# Patient Record
Sex: FEMALE | URBAN - METROPOLITAN AREA
[De-identification: names, ages, dates, MRNs, and addresses within clinical notes are randomized per-mention and may not be internally consistent; named-entity substitution may affect disease eponyms.]

---

## 2022-02-25 ENCOUNTER — TRANSCRIBE ORDER (OUTPATIENT)
Dept: SCHEDULING | Age: 77
End: 2022-02-25

## 2022-02-25 DIAGNOSIS — Z12.31 ENCOUNTER FOR SCREENING MAMMOGRAM FOR BREAST CANCER: Primary | ICD-10-CM

## 2022-06-30 ENCOUNTER — OFFICE VISIT (OUTPATIENT)
Dept: NEUROLOGY | Age: 77
End: 2022-06-30
Payer: COMMERCIAL

## 2022-06-30 VITALS
BODY MASS INDEX: 24.8 KG/M2 | SYSTOLIC BLOOD PRESSURE: 136 MMHG | WEIGHT: 158 LBS | DIASTOLIC BLOOD PRESSURE: 76 MMHG | HEART RATE: 52 BPM | HEIGHT: 67 IN

## 2022-06-30 DIAGNOSIS — I67.1 BRAIN ANEURYSM: Primary | ICD-10-CM

## 2022-06-30 PROCEDURE — 99204 OFFICE O/P NEW MOD 45 MIN: CPT | Performed by: PSYCHIATRY & NEUROLOGY

## 2022-06-30 PROCEDURE — 1123F ACP DISCUSS/DSCN MKR DOCD: CPT | Performed by: PSYCHIATRY & NEUROLOGY

## 2022-06-30 RX ORDER — ATENOLOL 25 MG/1
TABLET ORAL
COMMUNITY
Start: 2022-04-06

## 2022-06-30 ASSESSMENT — ENCOUNTER SYMPTOMS
GASTROINTESTINAL NEGATIVE: 1
EYES NEGATIVE: 1
RESPIRATORY NEGATIVE: 1

## 2022-06-30 NOTE — PROGRESS NOTES
[address], [place], [address]  Phone: [phone] Fax [phone]  Dr. Jenny Velasquez      6/30/2022  Katharine Moffett     Patient is referred by the following provider for consultation regarding as below:       I reviewed the available records and notes and have examined patient with the following findings:     Chief Complaint:  Chief Complaint   Patient presents with   [facility] Establish Care    Neurologic Problem     h/o Brain Aneurysm          HPI: This is a right handed 68 y.o.  female is very pleasant very appropriate patient unfortunately in 2017 she had one of her episodes of vertigo where the room was spinning on her whether she laid down or stood up. It lasted a total of a week and recovered but in that week she had an MRI done and was told it was normal and she was fine everything went on the way. She actually eventually was worked up in the emergency room and told she had migraines and not true vertigo even though I do not think she had a migraine at that time. She does have a history of migraines but those are actually headaches. 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 And that was done July 2021 she relocated back here to [place] has not seen a neurologist other than this visit. She her headaches she has not needed her Imitrex 50 mg tablets since she is even know the last time she used it. But I made it clear she is not to take it any further. She does have a history of lower back pain she was seen down at the [facility] at the [facility] up in [place] and they identified an MRI showing degenerative disc disease that was in the 1990s. She has not had any further work-up for her back since then.   She has not had any bouts of vertigo she has not had any headaches recently. She is actually stable and comfortable. IMAGING REVIEW:  I REVIEWED PERTINENT  IMAGES AND REPORTS WITH THE PATIENT PERSONALLY, DIRECTLY AND FULLY. Past Medical History:  No past medical history on file. Past Surgical History:  No past surgical history on file. Social History:  Social History     Socioeconomic History    Marital status:      Spouse name: Not on file    Number of children: Not on file    Years of education: Not on file    Highest education level: Not on file   Occupational History    Not on file   Tobacco Use    Smoking status: Not on file    Smokeless tobacco: Not on file   Substance and Sexual Activity    Alcohol use: Not on file    Drug use: Not on file    Sexual activity: Not on file   Other Topics Concern    Not on file   Social History Narrative    Not on file     Social Determinants of Health     Financial Resource Strain:     Difficulty of Paying Living Expenses: Not on file   Food Insecurity:     Worried About Running Out of Food in the Last Year: Not on file    Debbie of Food in the Last Year: Not on file   Transportation Needs:     Lack of Transportation (Medical): Not on file    Lack of Transportation (Non-Medical):  Not on file   Physical Activity:     Days of Exercise per Week: Not on file    Minutes of Exercise per Session: Not on file   Stress:     Feeling of Stress : Not on file   Social Connections:     Frequency of Communication with Friends and Family: Not on file    Frequency of Social Gatherings with Friends and Family: Not on file    Attends Scientology Services: Not on file    Active Member of Clubs or Organizations: Not on file    Attends Club or Organization Meetings: Not on file    Marital Status: Not on file   Intimate Partner Violence:     Fear of Current or Ex-Partner: Not on file    Emotionally Abused: Not on file    Physically Abused: Not on file    Sexually Abused: Not on file   Housing Stability:     Unable to Pay for Housing in the Last Year: Not on file    Number of Places Lived in the Last Year: Not on file    Unstable Housing in the Last Year: Not on file       Family History:   No family history on file. Current Outpatient Medications on File Prior to Visit   Medication Sig Dispense Refill    atenolol (TENORMIN) 25 MG tablet TAKE 1 TABLET BY MOUTH EVERY DAY FOR 90 DAYS      Cholecalciferol 100 MCG (4000 UT) CAPS Take 1 capsule by mouth daily      Cyanocobalamin (VITAMIN B 12 PO) Take 2,000 mcg by mouth daily      Multiple Vitamin (MULTIVITAMIN PO) Take 1 tablet by mouth daily      GLUCOSAMINE-CHONDROITIN PO Take by mouth daily      VITAMIN E PO Take 670 mg by mouth daily      GARLIC PO Take by mouth      Calcium-Magnesium-Vitamin D (CALCIUM MAGNESIUM PO) Take by mouth      Ascorbic Acid (VITAMIN C PO) Take by mouth       No current facility-administered medications on file prior to visit. Allergies   Allergen Reactions    Prednisone Other (See Comments)     Intolerance      Other Nausea And Vomiting     Vicoden       Review of Systems:  Review of Systems   Constitutional: Negative. HENT: Negative. Eyes: Negative. Respiratory: Negative. Cardiovascular: Negative. Gastrointestinal: Negative. Endocrine: Negative. Genitourinary: Negative. Musculoskeletal: Negative. Skin: Negative. Neurological: Positive for headaches. Hematological: Negative. Psychiatric/Behavioral: Negative. No flowsheet data found. No flowsheet data found. Vitals:    06/30/22 1445   BP: 136/76   Site: Left Upper Arm   Position: Sitting   Pulse: 52   Weight: 158 lb (71.7 kg)   Height: 5' 7\" (1.702 m)        Physical Exam  Constitutional:       Appearance: Normal appearance. HENT:      Head: Normocephalic and atraumatic.    Eyes:      Extraocular Movements: Extraocular movements intact and EOM normal.   Cardiovascular:      Rate and Rhythm: Normal rate and regular rhythm. Pulses: Normal pulses. Pulmonary:      Effort: Pulmonary effort is normal.   Abdominal:      General: Abdomen is flat. Neurological:      Mental Status: She is alert and oriented to person, place, and time. Gait: Gait is intact. Deep Tendon Reflexes:      Reflex Scores:       Tricep reflexes are 1+ on the right side and 1+ on the left side. Bicep reflexes are 1+ on the right side and 1+ on the left side. Brachioradialis reflexes are 1+ on the right side and 1+ on the left side. Patellar reflexes are 1+ on the right side and 1+ on the left side. Achilles reflexes are 1+ on the right side and 1+ on the left side. Neurologic Exam     Mental Status   Oriented to person, place, and time. Attention: normal. Concentration: normal.   Level of consciousness: alert  Knowledge: good. Cranial Nerves     CN II   Visual fields full to confrontation. CN III, IV, VI   Extraocular motions are normal.     CN VII   Facial expression full, symmetric. Motor Exam   Right arm tone: normal  Left arm tone: normal  Right leg tone: normal  Left leg tone: normal    Gait, Coordination, and Reflexes     Gait  Gait: normal    Tremor   Resting tremor: absent  Intention tremor: absent  Action tremor: absent    Reflexes   Right brachioradialis: 1+  Left brachioradialis: 1+  Right biceps: 1+  Left biceps: 1+  Right triceps: 1+  Left triceps: 1+  Right patellar: 1+  Left patellar: 1+  Right achilles: 1+  Left achilles: 1+          Assessment   Assessment / Plan:    Cecelia Aly was seen today for establish care and neurologic problem. 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 The best I can tell as it had not changed since the 2017 MR I compared to the MRA done in 2021 only according to the patient and the situation. Her MRA that was done July 2021 we have the report but not the films and she is can obtain the films were to move forward with a CTA of the head and see if there is any true changes. I have asked her not to take Imitrex any further and I gave her samples of Nurtec if she happens to get a migraine headache.  -     CTA HEAD W WO CONTRAST; Future        The Diagnosis and differential diagnostic considerations, and Rx Tx were reviewed with the patient at length. Orders Placed This Encounter   Procedures    CTA HEAD W WO CONTRAST     Standing Status:   Future     Standing Expiration Date:   6/30/2023     Order Specific Question:   STAT Creatinine as needed:     Answer:   Yes     Order Specific Question:   Reason for exam:     Answer:   please compare to the MRA that she will bring. I have spent greater than 50% of visit discussing and counseling of patient  for treatment and diagnostic plan review. Total time 45 min     . Notes: Patient is to continue all medications as directed by prescribing physicians. Continuations on today's visit are made based on the patient's report of current medications.              Dr. Dolores Rocha  Consultation Neurology, Neurodiagnostics and Neurotherapeutics  Neuroelectrophysiology, EEG, EMG  [facility]  [address] [place] [address], [address] [facility]  Phone:  [phone]  Fax:   [phone]

## 2022-07-25 ENCOUNTER — TELEPHONE (OUTPATIENT)
Dept: NEUROLOGY | Age: 77
End: 2022-07-25

## 2022-07-25 ENCOUNTER — HOSPITAL ENCOUNTER (OUTPATIENT)
Dept: CT IMAGING | Age: 77
Discharge: HOME | End: 2022-07-27
Payer: COMMERCIAL

## 2022-07-25 DIAGNOSIS — I67.1 BRAIN ANEURYSM: ICD-10-CM

## 2022-07-25 LAB — CREAT BLD-MCNC: 1.17 MG/DL (ref 0.8–1.5)

## 2022-07-25 PROCEDURE — 6360000004 HC RX CONTRAST MEDICATION: Performed by: PSYCHIATRY & NEUROLOGY

## 2022-07-25 PROCEDURE — 82565 ASSAY OF CREATININE: CPT

## 2022-07-25 PROCEDURE — 70496 CT ANGIOGRAPHY HEAD: CPT

## 2022-07-25 RX ORDER — SODIUM CHLORIDE 0.9 % (FLUSH) 0.9 %
5-40 SYRINGE (ML) INJECTION 2 TIMES DAILY
Status: DISCONTINUED | OUTPATIENT
Start: 2022-07-25 | End: 2022-07-28 | Stop reason: HOSPADM

## 2022-07-25 RX ORDER — 0.9 % SODIUM CHLORIDE 0.9 %
100 INTRAVENOUS SOLUTION INTRAVENOUS ONCE
Status: DISCONTINUED | OUTPATIENT
Start: 2022-07-25 | End: 2022-07-28 | Stop reason: HOSPADM

## 2022-07-25 RX ADMIN — IOHEXOL 60 ML: 350 INJECTION, SOLUTION INTRAVENOUS at 10:12

## 2022-07-25 NOTE — TELEPHONE ENCOUNTER
----- Message from Yina Mitchell DO sent at 7/25/2022 12:37 PM EDT -----  Regarding: mri  The patient was supposed to bring her MRI/CTA of the arteries that she had last time in order for them to compare them.   She has not brought them to the radiology department please reach out to her and ask her to do so so they can compare these

## 2022-07-25 NOTE — TELEPHONE ENCOUNTER
Call placed to patient and informed of Dr. Mónica Lemus message. Patient will be by the office during business hours to  and submit the films she dropped of here today to the radiology Dept. Closing encounter.

## 2022-11-15 ENCOUNTER — OFFICE VISIT (OUTPATIENT)
Dept: NEUROLOGY | Age: 77
End: 2022-11-15
Payer: COMMERCIAL

## 2022-11-15 VITALS
HEART RATE: 63 BPM | DIASTOLIC BLOOD PRESSURE: 91 MMHG | HEIGHT: 67 IN | BODY MASS INDEX: 24.8 KG/M2 | WEIGHT: 158 LBS | SYSTOLIC BLOOD PRESSURE: 156 MMHG

## 2022-11-15 DIAGNOSIS — G43.709 CHRONIC MIGRAINE WITHOUT AURA WITHOUT STATUS MIGRAINOSUS, NOT INTRACTABLE: ICD-10-CM

## 2022-11-15 DIAGNOSIS — R42 VERTIGO: ICD-10-CM

## 2022-11-15 DIAGNOSIS — I67.1 BRAIN ANEURYSM: Primary | ICD-10-CM

## 2022-11-15 PROCEDURE — 99214 OFFICE O/P EST MOD 30 MIN: CPT | Performed by: PSYCHIATRY & NEUROLOGY

## 2022-11-15 PROCEDURE — 1123F ACP DISCUSS/DSCN MKR DOCD: CPT | Performed by: PSYCHIATRY & NEUROLOGY

## 2022-11-15 RX ORDER — RIMEGEPANT SULFATE 75 MG/75MG
TABLET, ORALLY DISINTEGRATING ORAL
Qty: 9 TABLET | Refills: 11 | Status: SHIPPED | OUTPATIENT
Start: 2022-11-15

## 2022-11-15 ASSESSMENT — ENCOUNTER SYMPTOMS
ALLERGIC/IMMUNOLOGIC NEGATIVE: 1
GASTROINTESTINAL NEGATIVE: 1
EYES NEGATIVE: 1
RESPIRATORY NEGATIVE: 1

## 2022-11-15 NOTE — PROGRESS NOTES
[address], [address], [address]  Phone: [phone] Fax [phone]  Dr. Elaine Saldaña      11/15/2022  Ardom Coronelin     Patient is referred by the following provider for consultation regarding as below:       I reviewed the available records and notes and have examined patient with the following findings:     Chief Complaint:  Chief Complaint   Patient presents with    Neurologic Problem     Brain aneurysm          HPI: This is a right handed 68 y.o.  female who is very pleasant very appropriate patient in 2017 she had a \"episode\" of vertigo where the room started spinning on her. Lasted 1 week and everything was okay as seen on the MRI. She eventually went to the emergency room because she had a migraine headache or they told her that the vertigo that she was having was migraine related. But she did not actually have the headache component. She does have a history of migraines but they have never had vertigo associated with it. She relocated but the [place] neurologist looked at the MRI and found an aneurysm. 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 We just repeated it currently and its 2.5 mm so its not much change at all of the right ICA. At the same time we had discontinued her Imitrex because of the aneurysm she is not allowed to take any of the triptans put her on Nurtec 75 mg for her chronic migraine headaches which she has very rarely and she is done very well with the medicine she does not need preventative medicine for headaches. She is otherwise stable. She does have a history of lower lumbar back pain evaluated at the [address] [place] [address],[address] in [place]. IMAGING REVIEW:  I REVIEWED PERTINENT  IMAGES AND REPORTS WITH THE PATIENT PERSONALLY, DIRECTLY AND FULLY. Past Medical History:  No past medical history on file.     Past Surgical History:  No past surgical history on file. Social History:  Social History     Socioeconomic History    Marital status:      Spouse name: Not on file    Number of children: Not on file    Years of education: Not on file    Highest education level: Not on file   Occupational History    Not on file   Tobacco Use    Smoking status: Never    Smokeless tobacco: Never   Substance and Sexual Activity    Alcohol use: Not on file    Drug use: Not on file    Sexual activity: Not on file   Other Topics Concern    Not on file   Social History Narrative    Not on file     Social Determinants of Health     Financial Resource Strain: Not on file   Food Insecurity: Not on file   Transportation Needs: Not on file   Physical Activity: Not on file   Stress: Not on file   Social Connections: Not on file   Intimate Partner Violence: Not on file   Housing Stability: Not on file       Family History:   No family history on file. Current Outpatient Medications on File Prior to Visit   Medication Sig Dispense Refill    atenolol (TENORMIN) 25 MG tablet TAKE 1 TABLET BY MOUTH EVERY DAY FOR 90 DAYS      Cholecalciferol 100 MCG (4000 UT) CAPS Take 1 capsule by mouth daily      Cyanocobalamin (VITAMIN B 12 PO) Take 2,000 mcg by mouth daily      Multiple Vitamin (MULTIVITAMIN PO) Take 1 tablet by mouth daily      GLUCOSAMINE-CHONDROITIN PO Take by mouth daily      VITAMIN E PO Take 670 mg by mouth daily      GARLIC PO Take by mouth      Calcium-Magnesium-Vitamin D (CALCIUM MAGNESIUM PO) Take by mouth      Ascorbic Acid (VITAMIN C PO) Take by mouth       No current facility-administered medications on file prior to visit. Allergies   Allergen Reactions    Prednisone Other (See Comments)     Intolerance      Other Nausea And Vomiting     Vicoden       Review of Systems:  Review of Systems   Constitutional: Negative. HENT: Negative. Eyes: Negative. Respiratory: Negative. Cardiovascular: Negative. Gastrointestinal: Negative.     Endocrine: Negative. Genitourinary: Negative. Musculoskeletal: Negative. Skin: Negative. Allergic/Immunologic: Negative. Neurological:  Positive for dizziness. Hematological: Negative. Psychiatric/Behavioral: Negative. No flowsheet data found. No flowsheet data found. Vitals:    11/15/22 0915   BP: (!) 156/91   Pulse: 63   Weight: 158 lb (71.7 kg)   Height: 5' 7\" (1.702 m)        Physical Exam  Constitutional:       Appearance: Normal appearance. HENT:      Head: Normocephalic and atraumatic. Eyes:      Extraocular Movements: Extraocular movements intact and EOM normal.      Pupils: Pupils are equal, round, and reactive to light. Cardiovascular:      Pulses: Normal pulses. Pulmonary:      Effort: Pulmonary effort is normal.   Abdominal:      General: Abdomen is flat. Neurological:      Mental Status: She is alert and oriented to person, place, and time. Gait: Gait is intact. Deep Tendon Reflexes:      Reflex Scores:       Tricep reflexes are 1+ on the right side and 1+ on the left side. Bicep reflexes are 1+ on the right side and 1+ on the left side. Brachioradialis reflexes are 1+ on the right side and 1+ on the left side. Patellar reflexes are 1+ on the right side and 1+ on the left side. Achilles reflexes are 1+ on the right side and 1+ on the left side. Neurologic Exam     Mental Status   Oriented to person, place, and time. Attention: normal. Concentration: normal.   Level of consciousness: alert  Knowledge: good. Her blood pressure was rechecked and was 140/80     Cranial Nerves     CN II   Visual fields full to confrontation. CN III, IV, VI   Pupils are equal, round, and reactive to light. Extraocular motions are normal.     CN VII   Facial expression full, symmetric.      Motor Exam   Right arm tone: normal  Left arm tone: normal  Right leg tone: normal  Left leg tone: normal    Gait, Coordination, and Reflexes     Gait  Gait:

## 2022-12-22 DIAGNOSIS — G43.709 CHRONIC MIGRAINE WITHOUT AURA WITHOUT STATUS MIGRAINOSUS, NOT INTRACTABLE: Primary | ICD-10-CM

## 2022-12-22 RX ORDER — RIMEGEPANT SULFATE 75 MG/75MG
TABLET, ORALLY DISINTEGRATING ORAL
Qty: 9 TABLET | Refills: 11 | Status: SHIPPED | OUTPATIENT
Start: 2022-12-22

## 2022-12-28 ENCOUNTER — TELEPHONE (OUTPATIENT)
Dept: NEUROLOGY | Age: 77
End: 2022-12-28

## 2022-12-28 NOTE — TELEPHONE ENCOUNTER
Pt left vm for us to call [facility] and release hold on Nurtec? I spoke with the pt, she is not sure what hold on Nurtec? Pt advised that I will call [facility] and then let her know. I called [facility], spoke with Lili Sy, PA is required. I will send this to [facility] and let the pt know she can get some samples. I called and spoke with the pt, advised her to come and get some samples and once [facility] is back, she will get the PA done.

## 2023-03-20 ENCOUNTER — TELEPHONE (OUTPATIENT)
Dept: NEUROLOGY | Age: 78
End: 2023-03-20

## 2023-03-20 NOTE — TELEPHONE ENCOUNTER
Patient has small aneurysm. She needs oral surgery and they want to give her anesthesia to completely knock her out. She wants to make sure that would be okay. She is aware you are on vacay this week and she is okay with waiting until next week for a response.

## 2023-03-23 NOTE — TELEPHONE ENCOUNTER
Dominick Panchal, DO  You 2 days ago     Niurka Angel  If you could let her know that I do not really feel like it is very safe to do that at this time. I would feel better if we had the aneurysm specialist over at [facility] take a look at it before she has any anesthesia. Ask if it is okay if we set her up for an endovascular evaluation or consult. Patient informed. She is going to the consultation and she will see if she can get it done without general anesthesia. If she cannot, she will call the office back to get the referral to soha.

## 2023-04-21 ENCOUNTER — HOSPITAL ENCOUNTER (OUTPATIENT)
Dept: CT IMAGING | Age: 78
End: 2023-04-21
Payer: MEDICARE

## 2023-04-21 DIAGNOSIS — I67.1 BRAIN ANEURYSM: ICD-10-CM

## 2023-04-21 LAB — CREAT BLD-MCNC: 1.11 MG/DL (ref 0.8–1.5)

## 2023-04-21 PROCEDURE — 6360000004 HC RX CONTRAST MEDICATION: Performed by: PSYCHIATRY & NEUROLOGY

## 2023-04-21 PROCEDURE — 82565 ASSAY OF CREATININE: CPT

## 2023-04-21 PROCEDURE — 70496 CT ANGIOGRAPHY HEAD: CPT

## 2023-04-21 PROCEDURE — 2580000003 HC RX 258: Performed by: PSYCHIATRY & NEUROLOGY

## 2023-04-21 RX ORDER — SODIUM CHLORIDE 0.9 % (FLUSH) 0.9 %
10 SYRINGE (ML) INJECTION
Status: COMPLETED | OUTPATIENT
Start: 2023-04-21 | End: 2023-04-21

## 2023-04-21 RX ORDER — 0.9 % SODIUM CHLORIDE 0.9 %
100 INTRAVENOUS SOLUTION INTRAVENOUS
Status: COMPLETED | OUTPATIENT
Start: 2023-04-21 | End: 2023-04-21

## 2023-04-21 RX ADMIN — IOPAMIDOL 100 ML: 755 INJECTION, SOLUTION INTRAVENOUS at 13:40

## 2023-04-21 RX ADMIN — SODIUM CHLORIDE, PRESERVATIVE FREE 10 ML: 5 INJECTION INTRAVENOUS at 13:40

## 2023-04-21 RX ADMIN — SODIUM CHLORIDE 100 ML: 9 INJECTION, SOLUTION INTRAVENOUS at 13:40

## 2023-07-12 ENCOUNTER — OFFICE VISIT (OUTPATIENT)
Dept: NEUROLOGY | Age: 78
End: 2023-07-12
Payer: MEDICARE

## 2023-07-12 DIAGNOSIS — G43.709 CHRONIC MIGRAINE WITHOUT AURA WITHOUT STATUS MIGRAINOSUS, NOT INTRACTABLE: Primary | ICD-10-CM

## 2023-07-12 DIAGNOSIS — I67.1 BRAIN ANEURYSM: ICD-10-CM

## 2023-07-12 PROCEDURE — 1123F ACP DISCUSS/DSCN MKR DOCD: CPT | Performed by: PSYCHIATRY & NEUROLOGY

## 2023-07-12 PROCEDURE — 99214 OFFICE O/P EST MOD 30 MIN: CPT | Performed by: PSYCHIATRY & NEUROLOGY

## 2023-07-12 ASSESSMENT — ENCOUNTER SYMPTOMS
GASTROINTESTINAL NEGATIVE: 1
EYES NEGATIVE: 1
RESPIRATORY NEGATIVE: 1

## 2023-07-12 NOTE — PROGRESS NOTES
[facility], [address] [facility], [address]  Phone: [phone] Fax [phone]  Dr. Ana Munroe      7/12/2023  Veronica Osorio     Patient is referred by the following provider for consultation regarding as below:       I reviewed the available records and notes and have examined patient with the following findings:     Chief Complaint:  No chief complaint on file. HPI: This is a right handed 66 y.o.  female who is very pleasant very appropriate patient in 2017 she had an episode of \"vertigo\" room spinning on her. They did a full work-up MRI of the brain was normal.  And almost never is there a headache associated with this. She went down to [place] and when the neurologist down there was concern that it may be an acephalic migraine headache that presents with vertigo. But that particular neurologist also looked back at the MRI and found an aneurysm that was missed. The CTA does show a 2 mm aneurysm we repeated it on 4- and it is grown a little bit to a 2.5 mm aneurysm. 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 There is been a small increase in size of the BR berry aneurysm and its time we are going to get endovascular team to look at her. She did have some recent oral surgery about 2 weeks ago and every time she receives anesthesia it triggers severe headaches. She is improving with those headaches at this point Vicodin and Tylenol did not help at all. Unfortunately we cannot really use steroids because we she is trying to heal from that surgery. But the headaches have improved. The Nurtec were giving her is not for that headache and it did not work for that headache which is obvious.   She has tried Imitrex in the past but she is not allowed to take any more triptans because of the

## 2023-10-24 ENCOUNTER — OFFICE VISIT (OUTPATIENT)
Dept: UROLOGY | Age: 78
End: 2023-10-24
Payer: MEDICARE

## 2023-10-24 DIAGNOSIS — R10.9 RIGHT FLANK PAIN: ICD-10-CM

## 2023-10-24 DIAGNOSIS — N13.30 HYDRONEPHROSIS OF RIGHT KIDNEY: Primary | ICD-10-CM

## 2023-10-24 LAB
BILIRUBIN, URINE, POC: NEGATIVE
BLOOD URINE, POC: NORMAL
GLUCOSE URINE, POC: NEGATIVE
KETONES, URINE, POC: NEGATIVE
LEUKOCYTE ESTERASE, URINE, POC: NORMAL
NITRITE, URINE, POC: NEGATIVE
PH, URINE, POC: 5 (ref 4.6–8)
PROTEIN,URINE, POC: NEGATIVE
SPECIFIC GRAVITY, URINE, POC: 1.02 (ref 1–1.03)
URINALYSIS CLARITY, POC: NORMAL
URINALYSIS COLOR, POC: NORMAL
UROBILINOGEN, POC: NORMAL

## 2023-10-24 PROCEDURE — 1123F ACP DISCUSS/DSCN MKR DOCD: CPT | Performed by: UROLOGY

## 2023-10-24 PROCEDURE — 81003 URINALYSIS AUTO W/O SCOPE: CPT | Performed by: UROLOGY

## 2023-10-24 PROCEDURE — 99204 OFFICE O/P NEW MOD 45 MIN: CPT | Performed by: UROLOGY

## 2023-10-24 RX ORDER — LISINOPRIL 10 MG/1
10 TABLET ORAL
COMMUNITY
Start: 2023-08-26

## 2023-10-24 RX ORDER — CYCLOBENZAPRINE HCL 10 MG
TABLET ORAL
COMMUNITY
Start: 2023-09-11

## 2023-10-24 ASSESSMENT — ENCOUNTER SYMPTOMS
ABDOMINAL PAIN: 0
INDIGESTION: 0
BLOOD IN STOOL: 0
NAUSEA: 0
HEARTBURN: 0
VOMITING: 0
CONSTIPATION: 0
WHEEZING: 0
COUGH: 0
DIARRHEA: 0
EYE DISCHARGE: 0
EYE PAIN: 0
SHORTNESS OF BREATH: 0
BACK PAIN: 1
SKIN LESIONS: 0

## 2023-10-24 NOTE — PROGRESS NOTES
[address]  [phone]          Delma Maynard  : 1945    Chief Complaint   Patient presents with    Kidney Problem    New Patient          HPI     Delma Maynard is a 66 y.o. female referred by Varun Pop in regards to R hydronephrosis by ultrasound 23 that resolved by ultrasound after voiding. She relates several months of intermittent (annoying not debilitating) pain in the R flank area. From her showing me, it appears a little higher that where upper pole of R kidney would be. She cannot relate it to any event and has not noticed it to be worse before or during voiding. NO gross hematuria. NO family history of RCC. Past Medical History:   Diagnosis Date    Hypertension      Past Surgical History:   Procedure Laterality Date    HYSTERECTOMY, TOTAL ABDOMINAL (CERVIX REMOVED)       Current Outpatient Medications   Medication Sig Dispense Refill    cyclobenzaprine (FLEXERIL) 10 MG tablet TAKE 1 TABLET BY MOUTH EVERY DAY AT BEDTIME AS NEEDED FOR 30 DAYS      lisinopril (PRINIVIL;ZESTRIL) 10 MG tablet Take 1 tablet by mouth      TURMERIC PO Take by mouth      Cyanocobalamin (VITAMIN B 12 PO) Take 2,000 mcg by mouth daily      Multiple Vitamin (MULTIVITAMIN PO) Take 1 tablet by mouth daily      VITAMIN E PO Take 670 mg by mouth daily      GARLIC PO Take by mouth      Ascorbic Acid (VITAMIN C PO) Take by mouth      Rimegepant Sulfate (NURTEC) 75 MG TBDP Take one in a 24 hour to brak a headache (Patient not taking: Reported on 10/24/2023) 9 tablet 11    Cholecalciferol 100 MCG (4000 UT) CAPS Take 1 capsule by mouth daily (Patient not taking: Reported on 10/24/2023)      GLUCOSAMINE-CHONDROITIN PO Take by mouth daily (Patient not taking: Reported on 10/24/2023)      Calcium-Magnesium-Vitamin D (CALCIUM MAGNESIUM PO) Take by mouth (Patient not taking: Reported on 10/24/2023)       No current facility-administered medications for this visit.

## 2023-10-31 ENCOUNTER — HOSPITAL ENCOUNTER (OUTPATIENT)
Dept: CT IMAGING | Age: 78
Discharge: HOME | End: 2023-11-02
Payer: MEDICARE

## 2023-10-31 DIAGNOSIS — N13.30 HYDRONEPHROSIS OF RIGHT KIDNEY: ICD-10-CM

## 2023-10-31 LAB — CREAT BLD-MCNC: 1.19 MG/DL (ref 0.8–1.5)

## 2023-10-31 PROCEDURE — 6360000004 HC RX CONTRAST MEDICATION: Performed by: UROLOGY

## 2023-10-31 PROCEDURE — 82565 ASSAY OF CREATININE: CPT

## 2023-10-31 PROCEDURE — 74178 CT ABD&PLV WO CNTR FLWD CNTR: CPT

## 2023-10-31 RX ADMIN — IOPAMIDOL 100 ML: 755 INJECTION, SOLUTION INTRAVENOUS at 11:26

## 2023-11-14 ENCOUNTER — PROCEDURE VISIT (OUTPATIENT)
Dept: UROLOGY | Age: 78
End: 2023-11-14
Payer: MEDICARE

## 2023-11-14 DIAGNOSIS — N13.30 HYDRONEPHROSIS OF RIGHT KIDNEY: Primary | ICD-10-CM

## 2023-11-14 DIAGNOSIS — R10.9 RIGHT FLANK PAIN: ICD-10-CM

## 2023-11-14 LAB
BILIRUBIN, URINE, POC: NORMAL
BLOOD URINE, POC: NEGATIVE
GLUCOSE URINE, POC: NEGATIVE
KETONES, URINE, POC: NEGATIVE
LEUKOCYTE ESTERASE, URINE, POC: NORMAL
NITRITE, URINE, POC: NEGATIVE
PH, URINE, POC: 5.5 (ref 4.6–8)
PROTEIN,URINE, POC: NEGATIVE
SPECIFIC GRAVITY, URINE, POC: 1.02 (ref 1–1.03)
URINALYSIS CLARITY, POC: NORMAL
URINALYSIS COLOR, POC: NORMAL
UROBILINOGEN, POC: NORMAL

## 2023-11-14 PROCEDURE — 52000 CYSTOURETHROSCOPY: CPT | Performed by: UROLOGY

## 2023-11-14 PROCEDURE — 81003 URINALYSIS AUTO W/O SCOPE: CPT | Performed by: UROLOGY

## 2023-11-14 NOTE — PROGRESS NOTES
[facility]  [address] [place] [address]  [phone]    Rupa Fortune  : 1945         HPI   66 y.o., female referred by [facility] in regards to R hydronephrosis by ultrasound 23 that resolved by ultrasound after voiding. She relates several months of intermittent (annoying not debilitating) pain in the R flank area. From her showing me, it appears a little higher that where upper pole of R kidney would be. She cannot relate it to any event and has not noticed it to be worse before or during voiding. NO gross hematuria. NO family history of RCC. CT hematuria protocol 10/31/23 revealed NO urologic abnormality. Cystoscopy 23 revealed NO gaping R UO.         Past Medical History:   Diagnosis Date    Hypertension      Past Surgical History:   Procedure Laterality Date    HYSTERECTOMY, TOTAL ABDOMINAL (CERVIX REMOVED)       Current Outpatient Medications   Medication Sig Dispense Refill    cyclobenzaprine (FLEXERIL) 10 MG tablet TAKE 1 TABLET BY MOUTH EVERY DAY AT BEDTIME AS NEEDED FOR 30 DAYS      lisinopril (PRINIVIL;ZESTRIL) 10 MG tablet Take 1 tablet by mouth      TURMERIC PO Take by mouth      Cyanocobalamin (VITAMIN B 12 PO) Take 2,000 mcg by mouth daily      Multiple Vitamin (MULTIVITAMIN PO) Take 1 tablet by mouth daily      VITAMIN E PO Take 670 mg by mouth daily      GARLIC PO Take by mouth      Ascorbic Acid (VITAMIN C PO) Take by mouth      Rimegepant Sulfate (NURTEC) 75 MG TBDP Take one in a 24 hour to brak a headache (Patient not taking: Reported on 10/24/2023) 9 tablet 11    Cholecalciferol 100 MCG (4000 UT) CAPS Take 1 capsule by mouth daily (Patient not taking: Reported on 10/24/2023)      GLUCOSAMINE-CHONDROITIN PO Take by mouth daily (Patient not taking: Reported on 10/24/2023)      Calcium-Magnesium-Vitamin D (CALCIUM MAGNESIUM PO) Take by mouth (Patient not taking: Reported on 10/24/2023)       No current facility-administered medications for this

## 2024-02-19 ENCOUNTER — HOSPITAL ENCOUNTER (OUTPATIENT)
Dept: ULTRASOUND IMAGING | Age: 79
Discharge: HOME | End: 2024-02-21
Payer: MEDICARE

## 2024-02-19 DIAGNOSIS — N18.31 CHRONIC KIDNEY DISEASE, STAGE 3A (HCC): ICD-10-CM

## 2024-02-19 PROCEDURE — 93975 VASCULAR STUDY: CPT | Performed by: RADIOLOGY

## 2024-02-19 PROCEDURE — 93975 VASCULAR STUDY: CPT

## 2024-04-12 ENCOUNTER — OFFICE VISIT (OUTPATIENT)
Dept: NEUROLOGY | Age: 79
End: 2024-04-12
Payer: MEDICARE

## 2024-04-12 VITALS
HEART RATE: 79 BPM | BODY MASS INDEX: 23.64 KG/M2 | DIASTOLIC BLOOD PRESSURE: 83 MMHG | WEIGHT: 150.6 LBS | OXYGEN SATURATION: 97 % | HEIGHT: 67 IN | SYSTOLIC BLOOD PRESSURE: 154 MMHG

## 2024-04-12 DIAGNOSIS — G43.809 OTHER MIGRAINE WITHOUT STATUS MIGRAINOSUS, NOT INTRACTABLE: ICD-10-CM

## 2024-04-12 DIAGNOSIS — I67.1 BRAIN ANEURYSM: Primary | ICD-10-CM

## 2024-04-12 PROCEDURE — 99214 OFFICE O/P EST MOD 30 MIN: CPT | Performed by: PSYCHIATRY & NEUROLOGY

## 2024-04-12 PROCEDURE — 1123F ACP DISCUSS/DSCN MKR DOCD: CPT | Performed by: PSYCHIATRY & NEUROLOGY

## 2024-04-12 ASSESSMENT — ENCOUNTER SYMPTOMS
GASTROINTESTINAL NEGATIVE: 1
EYES NEGATIVE: 1
RESPIRATORY NEGATIVE: 1
ALLERGIC/IMMUNOLOGIC NEGATIVE: 1

## 2024-04-12 NOTE — PROGRESS NOTES
[facility]  [address], [address]  [place] [address]  Phone: [phone] Fax [phone]  Dr. Robbie Sparks      4/12/2024  Jamila Marx     Patient is referred by the following provider for consultation regarding as below:       I reviewed the available records and notes and have examined patient with the following findings:     Chief Complaint:  No chief complaint on file.         HPI: This is a risk associated withright handed 79 y.o.  female who in 2017 she started having an episodes of vertigo.  And without any headaches.  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  And never does get headaches for the most part.  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  We repeated the CTA 1 year later which was 4/21/2023 showing that it had grown from 2 to 2.5 mm which of course is not a large aneurysm but yet it still grow so I was concerned that we got Dr. Guzman over at [facility] neuroendovascular's to evaluate her and he does not feel we need to address it at all nor does he feel we need to repeat any imaging or never mention that.  The patient herself is not very thrilled with the evaluation and would choose to go elsewhere if this grows.  In regards to her migraine headaches which are really acephalic auras of vertigo Nurtec 75 mg works great at breaking that cycle.  She has no other complaints at all she is doing very well and is stable.    IMAGING REVIEW:  I REVIEWED PERTINENT  IMAGES AND REPORTS WITH THE PATIENT PERSONALLY, DIRECTLY AND FULLY.     Past Medical History:  Past Medical History:   Diagnosis Date    Hypertension        Past Surgical History:  Past

## 2024-04-25 ENCOUNTER — HOSPITAL ENCOUNTER (OUTPATIENT)
Dept: CT IMAGING | Age: 79
Discharge: HOME | End: 2024-04-27
Payer: MEDICARE

## 2024-04-25 DIAGNOSIS — I67.1 BRAIN ANEURYSM: ICD-10-CM

## 2024-04-25 LAB — CREAT BLD-MCNC: 0.86 MG/DL (ref 0.8–1.5)

## 2024-04-25 PROCEDURE — 82565 ASSAY OF CREATININE: CPT

## 2024-04-25 PROCEDURE — 70496 CT ANGIOGRAPHY HEAD: CPT

## 2024-04-25 PROCEDURE — 6360000004 HC RX CONTRAST MEDICATION: Performed by: PSYCHIATRY & NEUROLOGY

## 2024-04-25 RX ADMIN — IOPAMIDOL 60 ML: 755 INJECTION, SOLUTION INTRAVENOUS at 14:29

## 2024-04-30 ENCOUNTER — CLINICAL DOCUMENTATION (OUTPATIENT)
Dept: NEUROLOGY | Age: 79
End: 2024-04-30

## 2025-04-14 ENCOUNTER — OFFICE VISIT (OUTPATIENT)
Dept: NEUROLOGY | Age: 80
End: 2025-04-14
Payer: MEDICARE

## 2025-04-14 VITALS — BODY MASS INDEX: 24.91 KG/M2 | WEIGHT: 155 LBS | HEIGHT: 66 IN

## 2025-04-14 DIAGNOSIS — G43.909 MIGRAINE WITHOUT STATUS MIGRAINOSUS, NOT INTRACTABLE, UNSPECIFIED MIGRAINE TYPE: ICD-10-CM

## 2025-04-14 DIAGNOSIS — M54.50 LUMBAR BACK PAIN: ICD-10-CM

## 2025-04-14 DIAGNOSIS — I67.1 BRAIN ANEURYSM: Primary | ICD-10-CM

## 2025-04-14 PROCEDURE — 99214 OFFICE O/P EST MOD 30 MIN: CPT | Performed by: PSYCHIATRY & NEUROLOGY

## 2025-04-14 PROCEDURE — 1159F MED LIST DOCD IN RCRD: CPT | Performed by: PSYCHIATRY & NEUROLOGY

## 2025-04-14 PROCEDURE — 1160F RVW MEDS BY RX/DR IN RCRD: CPT | Performed by: PSYCHIATRY & NEUROLOGY

## 2025-04-14 PROCEDURE — 1123F ACP DISCUSS/DSCN MKR DOCD: CPT | Performed by: PSYCHIATRY & NEUROLOGY

## 2025-04-14 ASSESSMENT — ENCOUNTER SYMPTOMS
RESPIRATORY NEGATIVE: 1
GASTROINTESTINAL NEGATIVE: 1
EYES NEGATIVE: 1
ALLERGIC/IMMUNOLOGIC NEGATIVE: 1

## 2025-04-14 NOTE — PROGRESS NOTES
[facility]  [address] [place] [address], [address]  [place] [address]  Phone: [phone] Fax [phone]  Dr. Robbie Sparks      4/14/2025  Jamila Marx     Patient is referred by the following provider for consultation regarding as below:       I reviewed the available records and notes and have examined patient with the following findings:     Chief Complaint:  Chief Complaint   Patient presents with    Follow-up          HPI: This is a right handed 80 y.o.  female who is very pleasant very appropriate patient unfortunately does have vertigo this started in 2017 without headaches though I do feel it is associated with her migraines.  They are MRI of the brain was normal.  They felt this was associated with headaches.  She went to [facility] neurologist who actually diagnosed her with acephalic migraine headaches with vertigo.  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  We repeated the CTA accrue from 2-2.5 and that was in April 2023.  [facility] the patient they did not feel any intervention needed and they did not even recommend further imaging though we are going to continue monitoring it.  We are going to do another CTA this year right now and then I will do every 2 years unless there is a change.  She is otherwise stable comfortable doing fairly well with no complaints.    IMAGING REVIEW:  I REVIEWED PERTINENT  IMAGES AND REPORTS WITH THE PATIENT PERSONALLY, DIRECTLY AND FULLY.     Past Medical History:  Past Medical History:   Diagnosis Date    Hypertension        Past Surgical History:  Past Surgical History:   Procedure Laterality Date    HYSTERECTOMY, TOTAL ABDOMINAL (CERVIX REMOVED)         Social History:  Social History     Socioeconomic History    Marital status:      Spouse name: Not on file    Number of children: Not on file    Years of education: Not on

## 2025-04-21 ENCOUNTER — CLINICAL DOCUMENTATION (OUTPATIENT)
Dept: NEUROLOGY | Age: 80
End: 2025-04-21

## 2025-04-21 ENCOUNTER — TELEPHONE (OUTPATIENT)
Dept: NEUROLOGY | Age: 80
End: 2025-04-21

## 2025-04-21 DIAGNOSIS — I67.1 BRAIN ANEURYSM: Primary | ICD-10-CM

## 2025-04-21 NOTE — PROGRESS NOTES
Patient called we are going to go ahead and repeat the CTA of the head and neck.  The neurosurgery and endovascular team had no intentions of repeating it I just want to keep an eye on it.  If this is unchanged we will redo it again in 2 years

## 2025-04-29 NOTE — ADDENDUM NOTE
Addended by: Madhav Stacy on: 7/12/2023 12:34 PM     Modules accepted: Orders Call to momJunie, updated with recommendations as below, Mom verbalized understanding.  Will call by Friday if no improvement.

## 2025-05-01 ENCOUNTER — HOSPITAL ENCOUNTER (OUTPATIENT)
Dept: CT IMAGING | Age: 80
Discharge: HOME | End: 2025-05-03
Payer: MEDICARE

## 2025-05-01 DIAGNOSIS — I67.1 BRAIN ANEURYSM: ICD-10-CM

## 2025-05-01 LAB — CREAT BLD-MCNC: 1.29 MG/DL (ref 0.8–1.5)

## 2025-05-01 PROCEDURE — 6360000004 HC RX CONTRAST MEDICATION: Performed by: PSYCHIATRY & NEUROLOGY

## 2025-05-01 PROCEDURE — 70498 CT ANGIOGRAPHY NECK: CPT | Performed by: RADIOLOGY

## 2025-05-01 PROCEDURE — 82565 ASSAY OF CREATININE: CPT

## 2025-05-01 PROCEDURE — 70496 CT ANGIOGRAPHY HEAD: CPT

## 2025-05-01 PROCEDURE — 70496 CT ANGIOGRAPHY HEAD: CPT | Performed by: RADIOLOGY

## 2025-05-01 RX ORDER — IOPAMIDOL 755 MG/ML
60 INJECTION, SOLUTION INTRAVASCULAR
Status: COMPLETED | OUTPATIENT
Start: 2025-05-01 | End: 2025-05-01

## 2025-05-01 RX ADMIN — IOPAMIDOL 60 ML: 755 INJECTION, SOLUTION INTRAVENOUS at 13:23

## 2025-05-02 ENCOUNTER — CLINICAL DOCUMENTATION (OUTPATIENT)
Dept: NEUROLOGY | Age: 80
End: 2025-05-02

## 2025-05-02 DIAGNOSIS — I67.1 SACCULAR ANEURYSM: Primary | ICD-10-CM

## 2025-05-02 NOTE — PROGRESS NOTES
The patient already saw Dr. Guzman in 2003 and the imaging is not changed so organ to hold off on having them reevaluate her.  If anything happens we will get another CTA otherwise we will do it yearly

## 2025-05-02 NOTE — PROGRESS NOTES
Despite that the aneurysm is small but yet a significant saccular component to it and unchanged from 2021.  I am going to pursue an evaluation by endovascular team over [facility]